# Patient Record
Sex: FEMALE | Race: BLACK OR AFRICAN AMERICAN | NOT HISPANIC OR LATINO | URBAN - METROPOLITAN AREA
[De-identification: names, ages, dates, MRNs, and addresses within clinical notes are randomized per-mention and may not be internally consistent; named-entity substitution may affect disease eponyms.]

---

## 2020-06-10 ENCOUNTER — OFFICE VISIT (OUTPATIENT)
Dept: URBAN - METROPOLITAN AREA CLINIC 91 | Facility: CLINIC | Age: 25
End: 2020-06-10

## 2020-06-18 ENCOUNTER — OFFICE VISIT (OUTPATIENT)
Dept: URBAN - METROPOLITAN AREA CLINIC 91 | Facility: CLINIC | Age: 25
End: 2020-06-18

## 2020-06-18 ENCOUNTER — CLAIMS CREATED FROM THE CLAIM WINDOW (OUTPATIENT)
Dept: URBAN - METROPOLITAN AREA CLINIC 91 | Facility: CLINIC | Age: 25
End: 2020-06-18

## 2020-06-18 ENCOUNTER — CLAIMS CREATED FROM THE CLAIM WINDOW (OUTPATIENT)
Dept: URBAN - METROPOLITAN AREA CLINIC 91 | Facility: CLINIC | Age: 25
End: 2020-06-18
Payer: COMMERCIAL

## 2020-06-18 DIAGNOSIS — K50.80 CROHN'S COLITIS: ICD-10-CM

## 2020-06-18 PROCEDURE — 96415 CHEMO IV INFUSION ADDL HR: CPT | Performed by: INTERNAL MEDICINE

## 2020-06-18 PROCEDURE — 96413 CHEMO IV INFUSION 1 HR: CPT | Performed by: INTERNAL MEDICINE

## 2020-06-18 RX ORDER — INFLIXIMAB 100 MG/10ML
INFUSE 5 MG/KG OVER NO LESS THAN 2 HOUR(S) BY INTRAVENOUS ROUTE AT 0, 2, AND 6 WEEKS FOLLOWED BY EVERY 8 WEEKS INJECTION, POWDER, LYOPHILIZED, FOR SOLUTION INTRAVENOUS
Qty: 1 | Refills: 0 | Status: ACTIVE | COMMUNITY
Start: 2017-09-20 | End: 1900-01-01

## 2020-08-21 ENCOUNTER — OFFICE VISIT (OUTPATIENT)
Dept: URBAN - METROPOLITAN AREA CLINIC 91 | Facility: CLINIC | Age: 25
End: 2020-08-21
Payer: COMMERCIAL

## 2020-08-21 VITALS
RESPIRATION RATE: 16 BRPM | BODY MASS INDEX: 31.71 KG/M2 | SYSTOLIC BLOOD PRESSURE: 120 MMHG | WEIGHT: 202 LBS | HEIGHT: 67 IN | DIASTOLIC BLOOD PRESSURE: 68 MMHG | HEART RATE: 88 BPM | TEMPERATURE: 97.2 F

## 2020-08-21 DIAGNOSIS — K50.80 CROHN'S COLITIS: ICD-10-CM

## 2020-08-21 PROCEDURE — 96413 CHEMO IV INFUSION 1 HR: CPT | Performed by: INTERNAL MEDICINE

## 2020-08-21 PROCEDURE — 96415 CHEMO IV INFUSION ADDL HR: CPT | Performed by: INTERNAL MEDICINE

## 2020-08-21 RX ORDER — INFLIXIMAB 100 MG/10ML
INFUSE 5 MG/KG OVER NO LESS THAN 2 HOUR(S) BY INTRAVENOUS ROUTE AT 0, 2, AND 6 WEEKS FOLLOWED BY EVERY 8 WEEKS INJECTION, POWDER, LYOPHILIZED, FOR SOLUTION INTRAVENOUS
Qty: 1 | Refills: 0 | Status: ACTIVE | COMMUNITY
Start: 2017-09-20 | End: 1900-01-01

## 2020-10-08 ENCOUNTER — TELEPHONE ENCOUNTER (OUTPATIENT)
Dept: URBAN - METROPOLITAN AREA CLINIC 6 | Facility: CLINIC | Age: 25
End: 2020-10-08

## 2020-10-08 ENCOUNTER — OFFICE VISIT (OUTPATIENT)
Dept: URBAN - METROPOLITAN AREA CLINIC 91 | Facility: CLINIC | Age: 25
End: 2020-10-08
Payer: COMMERCIAL

## 2020-10-08 VITALS
DIASTOLIC BLOOD PRESSURE: 87 MMHG | HEART RATE: 74 BPM | HEIGHT: 67 IN | SYSTOLIC BLOOD PRESSURE: 125 MMHG | BODY MASS INDEX: 32.18 KG/M2 | WEIGHT: 205 LBS | TEMPERATURE: 98.1 F | RESPIRATION RATE: 17 BRPM

## 2020-10-08 DIAGNOSIS — K50.80 CROHN'S COLITIS: ICD-10-CM

## 2020-10-08 PROCEDURE — 96415 CHEMO IV INFUSION ADDL HR: CPT | Performed by: INTERNAL MEDICINE

## 2020-10-08 PROCEDURE — 96413 CHEMO IV INFUSION 1 HR: CPT | Performed by: INTERNAL MEDICINE

## 2020-10-08 RX ORDER — INFLIXIMAB 100 MG/10ML
INFUSE 5 MG/KG OVER NO LESS THAN 2 HOUR(S) BY INTRAVENOUS ROUTE AT 0, 2, AND 6 WEEKS FOLLOWED BY EVERY 8 WEEKS INJECTION, POWDER, LYOPHILIZED, FOR SOLUTION INTRAVENOUS
Qty: 1 | Refills: 0 | Status: ACTIVE | COMMUNITY
Start: 2017-09-20 | End: 1900-01-01

## 2020-12-03 ENCOUNTER — OFFICE VISIT (OUTPATIENT)
Dept: URBAN - METROPOLITAN AREA CLINIC 91 | Facility: CLINIC | Age: 25
End: 2020-12-03

## 2020-12-09 ENCOUNTER — OFFICE VISIT (OUTPATIENT)
Dept: URBAN - METROPOLITAN AREA CLINIC 93 | Facility: CLINIC | Age: 25
End: 2020-12-09
Payer: COMMERCIAL

## 2020-12-09 VITALS
HEIGHT: 67 IN | TEMPERATURE: 97.3 F | SYSTOLIC BLOOD PRESSURE: 115 MMHG | DIASTOLIC BLOOD PRESSURE: 76 MMHG | BODY MASS INDEX: 32.33 KG/M2 | HEART RATE: 72 BPM | RESPIRATION RATE: 20 BRPM | WEIGHT: 206 LBS

## 2020-12-09 DIAGNOSIS — K50.80 CROHN'S COLITIS: ICD-10-CM

## 2020-12-09 PROCEDURE — 96415 CHEMO IV INFUSION ADDL HR: CPT | Performed by: INTERNAL MEDICINE

## 2020-12-09 PROCEDURE — 96413 CHEMO IV INFUSION 1 HR: CPT | Performed by: INTERNAL MEDICINE

## 2020-12-09 RX ORDER — INFLIXIMAB 100 MG/10ML
INFUSE 5 MG/KG OVER NO LESS THAN 2 HOUR(S) BY INTRAVENOUS ROUTE AT 0, 2, AND 6 WEEKS FOLLOWED BY EVERY 8 WEEKS INJECTION, POWDER, LYOPHILIZED, FOR SOLUTION INTRAVENOUS
Qty: 1 | Refills: 0 | Status: ACTIVE | COMMUNITY
Start: 2017-09-20 | End: 1900-01-01

## 2021-02-03 ENCOUNTER — OFFICE VISIT (OUTPATIENT)
Dept: URBAN - METROPOLITAN AREA CLINIC 93 | Facility: CLINIC | Age: 26
End: 2021-02-03
Payer: COMMERCIAL

## 2021-02-03 VITALS
BODY MASS INDEX: 31.71 KG/M2 | HEART RATE: 82 BPM | TEMPERATURE: 98.1 F | DIASTOLIC BLOOD PRESSURE: 70 MMHG | SYSTOLIC BLOOD PRESSURE: 120 MMHG | RESPIRATION RATE: 18 BRPM | WEIGHT: 202 LBS | HEIGHT: 67 IN

## 2021-02-03 DIAGNOSIS — K50.80 CROHN'S COLITIS: ICD-10-CM

## 2021-02-03 PROCEDURE — 96413 CHEMO IV INFUSION 1 HR: CPT | Performed by: INTERNAL MEDICINE

## 2021-02-03 RX ORDER — INFLIXIMAB 100 MG/10ML
INFUSE 5 MG/KG OVER NO LESS THAN 2 HOUR(S) BY INTRAVENOUS ROUTE AT 0, 2, AND 6 WEEKS FOLLOWED BY EVERY 8 WEEKS INJECTION, POWDER, LYOPHILIZED, FOR SOLUTION INTRAVENOUS
Qty: 1 | Refills: 0 | Status: ACTIVE | COMMUNITY
Start: 2017-09-20 | End: 1900-01-01

## 2021-04-07 ENCOUNTER — OFFICE VISIT (OUTPATIENT)
Dept: URBAN - METROPOLITAN AREA CLINIC 91 | Facility: CLINIC | Age: 26
End: 2021-04-07
Payer: COMMERCIAL

## 2021-04-07 VITALS
DIASTOLIC BLOOD PRESSURE: 65 MMHG | BODY MASS INDEX: 31.55 KG/M2 | HEIGHT: 67 IN | TEMPERATURE: 98.1 F | WEIGHT: 201 LBS | SYSTOLIC BLOOD PRESSURE: 119 MMHG | HEART RATE: 87 BPM | RESPIRATION RATE: 20 BRPM

## 2021-04-07 DIAGNOSIS — K50.80 CROHN'S COLITIS: ICD-10-CM

## 2021-04-07 PROCEDURE — 96415 CHEMO IV INFUSION ADDL HR: CPT | Performed by: INTERNAL MEDICINE

## 2021-04-07 PROCEDURE — 96413 CHEMO IV INFUSION 1 HR: CPT | Performed by: INTERNAL MEDICINE

## 2021-04-07 RX ORDER — INFLIXIMAB 100 MG/10ML
INFUSE 5 MG/KG OVER NO LESS THAN 2 HOUR(S) BY INTRAVENOUS ROUTE AT 0, 2, AND 6 WEEKS FOLLOWED BY EVERY 8 WEEKS INJECTION, POWDER, LYOPHILIZED, FOR SOLUTION INTRAVENOUS
Qty: 1 | Refills: 0 | Status: ACTIVE | COMMUNITY
Start: 2017-09-20 | End: 1900-01-01

## 2021-06-09 ENCOUNTER — OFFICE VISIT (OUTPATIENT)
Dept: URBAN - METROPOLITAN AREA CLINIC 93 | Facility: CLINIC | Age: 26
End: 2021-06-09
Payer: COMMERCIAL

## 2021-06-09 ENCOUNTER — TELEPHONE ENCOUNTER (OUTPATIENT)
Dept: URBAN - METROPOLITAN AREA CLINIC 93 | Facility: CLINIC | Age: 26
End: 2021-06-09

## 2021-06-09 VITALS
DIASTOLIC BLOOD PRESSURE: 73 MMHG | HEIGHT: 67 IN | TEMPERATURE: 98 F | HEART RATE: 79 BPM | RESPIRATION RATE: 18 BRPM | WEIGHT: 198 LBS | BODY MASS INDEX: 31.08 KG/M2 | SYSTOLIC BLOOD PRESSURE: 108 MMHG

## 2021-06-09 DIAGNOSIS — K50.80 CROHN'S COLITIS: ICD-10-CM

## 2021-06-09 PROCEDURE — 96413 CHEMO IV INFUSION 1 HR: CPT | Performed by: INTERNAL MEDICINE

## 2021-06-09 RX ORDER — INFLIXIMAB 100 MG/10ML
INFUSE 5 MG/KG OVER NO LESS THAN 2 HOUR(S) BY INTRAVENOUS ROUTE AT 0, 2, AND 6 WEEKS FOLLOWED BY EVERY 8 WEEKS INJECTION, POWDER, LYOPHILIZED, FOR SOLUTION INTRAVENOUS
Qty: 1 | Refills: 0 | Status: ACTIVE | COMMUNITY
Start: 2017-09-20 | End: 1900-01-01

## 2021-06-09 RX ORDER — INFLIXIMAB 100 MG/10ML
INFUSE 5 MG/KG OVER NO LESS THAN 1.5 HOURS BY INTRAVENOUS EVERY 8 WEEKS INJECTION, POWDER, LYOPHILIZED, FOR SOLUTION INTRAVENOUS
Qty: 90 | Refills: 6
Start: 2017-09-20 | End: 2022-01-10

## 2021-08-04 ENCOUNTER — OFFICE VISIT (OUTPATIENT)
Dept: URBAN - METROPOLITAN AREA CLINIC 93 | Facility: CLINIC | Age: 26
End: 2021-08-04
Payer: COMMERCIAL

## 2021-08-04 VITALS
WEIGHT: 201 LBS | HEIGHT: 67 IN | BODY MASS INDEX: 31.55 KG/M2 | DIASTOLIC BLOOD PRESSURE: 75 MMHG | SYSTOLIC BLOOD PRESSURE: 118 MMHG | TEMPERATURE: 98.1 F | RESPIRATION RATE: 20 BRPM | HEART RATE: 82 BPM

## 2021-08-04 DIAGNOSIS — K50.80 CROHN'S COLITIS: ICD-10-CM

## 2021-08-04 PROCEDURE — 96415 CHEMO IV INFUSION ADDL HR: CPT | Performed by: INTERNAL MEDICINE

## 2021-08-04 PROCEDURE — 96413 CHEMO IV INFUSION 1 HR: CPT | Performed by: INTERNAL MEDICINE

## 2021-08-04 RX ORDER — INFLIXIMAB 100 MG/10ML
INFUSE 5 MG/KG OVER NO LESS THAN 2 HOUR(S) BY INTRAVENOUS ROUTE AT 0, 2, AND 6 WEEKS FOLLOWED BY EVERY 8 WEEKS INJECTION, POWDER, LYOPHILIZED, FOR SOLUTION INTRAVENOUS
Qty: 1 | Refills: 0 | Status: ACTIVE | COMMUNITY
Start: 2017-09-20 | End: 1900-01-01

## 2021-10-13 ENCOUNTER — OFFICE VISIT (OUTPATIENT)
Dept: URBAN - METROPOLITAN AREA CLINIC 93 | Facility: CLINIC | Age: 26
End: 2021-10-13
Payer: COMMERCIAL

## 2021-10-13 VITALS
HEIGHT: 67 IN | DIASTOLIC BLOOD PRESSURE: 66 MMHG | HEART RATE: 77 BPM | SYSTOLIC BLOOD PRESSURE: 101 MMHG | TEMPERATURE: 98.4 F | BODY MASS INDEX: 31.55 KG/M2 | RESPIRATION RATE: 18 BRPM | WEIGHT: 201 LBS

## 2021-10-13 DIAGNOSIS — K50.80 CROHN'S COLITIS: ICD-10-CM

## 2021-10-13 PROCEDURE — 96413 CHEMO IV INFUSION 1 HR: CPT | Performed by: INTERNAL MEDICINE

## 2021-10-13 RX ORDER — INFLIXIMAB 100 MG/10ML
INFUSE 5 MG/KG OVER NO LESS THAN 2 HOUR(S) BY INTRAVENOUS ROUTE AT 0, 2, AND 6 WEEKS FOLLOWED BY EVERY 8 WEEKS INJECTION, POWDER, LYOPHILIZED, FOR SOLUTION INTRAVENOUS
Qty: 1 | Refills: 0 | Status: ACTIVE | COMMUNITY
Start: 2017-09-20 | End: 1900-01-01

## 2021-12-01 ENCOUNTER — OFFICE VISIT (OUTPATIENT)
Dept: URBAN - METROPOLITAN AREA CLINIC 109 | Facility: CLINIC | Age: 26
End: 2021-12-01

## 2021-12-01 RX ORDER — INFLIXIMAB 100 MG/10ML
INFUSE 5 MG/KG OVER NO LESS THAN 2 HOUR(S) BY INTRAVENOUS ROUTE AT 0, 2, AND 6 WEEKS FOLLOWED BY EVERY 8 WEEKS INJECTION, POWDER, LYOPHILIZED, FOR SOLUTION INTRAVENOUS
Qty: 1 | Refills: 0 | COMMUNITY
Start: 2017-09-20

## 2021-12-03 PROBLEM — 34000006 CROHN'S DISEASE: Status: ACTIVE | Noted: 2021-12-03

## 2021-12-14 ENCOUNTER — OFFICE VISIT (OUTPATIENT)
Dept: URBAN - METROPOLITAN AREA CLINIC 91 | Facility: CLINIC | Age: 26
End: 2021-12-14

## 2022-01-12 ENCOUNTER — TELEPHONE ENCOUNTER (OUTPATIENT)
Dept: URBAN - METROPOLITAN AREA CLINIC 92 | Facility: CLINIC | Age: 27
End: 2022-01-12

## 2022-01-12 PROBLEM — 34000006: Status: ACTIVE | Noted: 2022-01-12

## 2022-01-12 RX ORDER — INFLIXIMAB 100 MG/10ML
AS DIRECTED INJECTION, POWDER, LYOPHILIZED, FOR SOLUTION INTRAVENOUS
OUTPATIENT
Start: 2022-01-12

## 2022-03-30 ENCOUNTER — OFFICE VISIT (OUTPATIENT)
Dept: URBAN - METROPOLITAN AREA CLINIC 109 | Facility: CLINIC | Age: 27
End: 2022-03-30
Payer: COMMERCIAL

## 2022-03-30 DIAGNOSIS — K50.818 CROHN'S DISEASE OF BOTH SMALL AND LARGE INTESTINE WITH OTHER COMPLICATION: ICD-10-CM

## 2022-03-30 PROBLEM — 71833008: Status: ACTIVE | Noted: 2022-03-30

## 2022-03-30 PROCEDURE — 99214 OFFICE O/P EST MOD 30 MIN: CPT | Performed by: INTERNAL MEDICINE

## 2022-03-30 RX ORDER — ADALIMUMAB 40MG/0.8ML
0.8 ML KIT SUBCUTANEOUS EVERY 2 WEEKS
Qty: 90 | Refills: 3 | OUTPATIENT
Start: 2022-03-30 | End: 2023-03-25

## 2022-03-30 RX ORDER — INFLIXIMAB 100 MG/10ML
INFUSE 5 MG/KG OVER NO LESS THAN 2 HOUR(S) BY INTRAVENOUS ROUTE AT 0, 2, AND 6 WEEKS FOLLOWED BY EVERY 8 WEEKS INJECTION, POWDER, LYOPHILIZED, FOR SOLUTION INTRAVENOUS
Qty: 1 | Refills: 0 | COMMUNITY
Start: 2017-09-20

## 2022-03-30 RX ORDER — INFLIXIMAB 100 MG/10ML
AS DIRECTED INJECTION, POWDER, LYOPHILIZED, FOR SOLUTION INTRAVENOUS
Status: ACTIVE | COMMUNITY
Start: 2022-01-12

## 2022-03-30 NOTE — HPI-TODAY'S VISIT:
The patient presents for f/u appt due to ileo-colonic stricturing crohn's disease.  she has moved to [place], [place] since last infusion.  she has not had an infusion since october, 2021.  she had an episode of abd pain a few weeks ago that has since resolved.  denies gi bleeding, weight loss, etc.

## 2022-03-31 LAB
A/G RATIO: 1.3
ABSOLUTE BASOPHILS: 31
ABSOLUTE EOSINOPHILS: 21
ABSOLUTE LYMPHOCYTES: 2059
ABSOLUTE MONOCYTES: 270
ABSOLUTE NEUTROPHILS: 2818
ALBUMIN: 3.9
ALKALINE PHOSPHATASE: 98
ALT (SGPT): 13
AST (SGOT): 12
BASOPHILS: 0.6
BILIRUBIN, TOTAL: 0.4
BUN/CREATININE RATIO: (no result)
BUN: 13
C-REACTIVE PROTEIN, QUANT: 24.8
CALCIUM: 9.1
CARBON DIOXIDE, TOTAL: 25
CHLORIDE: 108
CREATININE: 0.74
EGFR AFRICAN AMERICAN: 131
EGFR NON-AFR. AMERICAN: 113
EOSINOPHILS: 0.4
GLOBULIN, TOTAL: 3
GLUCOSE: 87
HEMATOCRIT: 36.9
HEMOGLOBIN: 11.7
LYMPHOCYTES: 39.6
MCH: 24.9
MCHC: 31.7
MCV: 78.5
MONOCYTES: 5.2
MPV: 12.3
NEUTROPHILS: 54.2
PLATELET COUNT: 335
POTASSIUM: 4.3
PROTEIN, TOTAL: 6.9
RDW: 13.4
RED BLOOD CELL COUNT: 4.7
SODIUM: 142
VITAMIN B12: 425
WHITE BLOOD CELL COUNT: 5.2

## 2022-04-08 ENCOUNTER — TELEPHONE ENCOUNTER (OUTPATIENT)
Dept: URBAN - METROPOLITAN AREA CLINIC 118 | Facility: CLINIC | Age: 27
End: 2022-04-08

## 2022-05-06 ENCOUNTER — TELEPHONE ENCOUNTER (OUTPATIENT)
Dept: URBAN - METROPOLITAN AREA CLINIC 118 | Facility: CLINIC | Age: 27
End: 2022-05-06

## 2022-06-09 ENCOUNTER — DASHBOARD ENCOUNTERS (OUTPATIENT)
Age: 27
End: 2022-06-09

## 2022-06-29 ENCOUNTER — OFFICE VISIT (OUTPATIENT)
Dept: URBAN - METROPOLITAN AREA CLINIC 109 | Facility: CLINIC | Age: 27
End: 2022-06-29

## 2022-06-29 RX ORDER — ADALIMUMAB 40MG/0.8ML
0.8 ML KIT SUBCUTANEOUS EVERY 2 WEEKS
Qty: 90 | Refills: 3 | COMMUNITY
Start: 2022-03-30 | End: 2023-03-25

## 2022-06-29 RX ORDER — INFLIXIMAB 100 MG/10ML
INFUSE 5 MG/KG OVER NO LESS THAN 2 HOUR(S) BY INTRAVENOUS ROUTE AT 0, 2, AND 6 WEEKS FOLLOWED BY EVERY 8 WEEKS INJECTION, POWDER, LYOPHILIZED, FOR SOLUTION INTRAVENOUS
Qty: 1 | Refills: 0 | COMMUNITY
Start: 2017-09-20

## 2022-06-29 RX ORDER — INFLIXIMAB 100 MG/10ML
AS DIRECTED INJECTION, POWDER, LYOPHILIZED, FOR SOLUTION INTRAVENOUS
COMMUNITY
Start: 2022-01-12

## 2023-02-21 ENCOUNTER — TELEPHONE ENCOUNTER (OUTPATIENT)
Dept: URBAN - METROPOLITAN AREA CLINIC 118 | Facility: CLINIC | Age: 28
End: 2023-02-21